# Patient Record
Sex: MALE | Race: AMERICAN INDIAN OR ALASKA NATIVE
[De-identification: names, ages, dates, MRNs, and addresses within clinical notes are randomized per-mention and may not be internally consistent; named-entity substitution may affect disease eponyms.]

---

## 2018-12-19 ENCOUNTER — HOSPITAL ENCOUNTER (EMERGENCY)
Dept: HOSPITAL 5 - ED | Age: 43
Discharge: HOME | End: 2018-12-19
Payer: SELF-PAY

## 2018-12-19 VITALS — SYSTOLIC BLOOD PRESSURE: 190 MMHG | DIASTOLIC BLOOD PRESSURE: 130 MMHG

## 2018-12-19 DIAGNOSIS — N48.30: Primary | ICD-10-CM

## 2018-12-19 DIAGNOSIS — D57.3: ICD-10-CM

## 2018-12-19 DIAGNOSIS — Z88.2: ICD-10-CM

## 2018-12-19 DIAGNOSIS — Z88.0: ICD-10-CM

## 2018-12-19 PROCEDURE — 96374 THER/PROPH/DIAG INJ IV PUSH: CPT

## 2018-12-19 PROCEDURE — 96372 THER/PROPH/DIAG INJ SC/IM: CPT

## 2018-12-19 PROCEDURE — 54220 IRRG CRPRA CAVRNOSA PRIAPISM: CPT

## 2018-12-19 PROCEDURE — 99283 EMERGENCY DEPT VISIT LOW MDM: CPT

## 2018-12-19 NOTE — EMERGENCY DEPARTMENT REPORT
ED General Adult HPI





- General


Chief complaint: Urogenital-Male


Stated complaint: ERECTION 4 HOURS


Time Seen by Provider: 12/19/18 12:31


Source: patient, EMS


Mode of arrival: Stretcher


Limitations: No Limitations





- History of Present Illness


Initial comments: 


43-year-old male with a history of sickle cell trait and recurrent priapism 

presents with a prolonged erection.  Patient states that after presenting to the

emergency department yesterday and having the procedure to achieve detumscence 

from his priapism patient left from the emergency department.  Patient states 

that he doesn't believe his priapism ever truly resolved and thus presented 

right back to emergency department.  Patient denies any hematuria.  Patient is 

uncomfortable and states the pain is 10 out of 10.





- Related Data


                                Home Medications











 Medication  Instructions  Recorded  Confirmed  Last Taken


 


No Known Home Medications [No  12/17/18 12/17/18 Unknown





Reported Home Medications]    











                                    Allergies











Allergy/AdvReac Type Severity Reaction Status Date / Time


 


Penicillins Allergy  Hives Verified 12/10/18 12:31


 


Sulfa (Sulfonamide Allergy  Hives Verified 12/10/18 12:31





Antibiotics)     














ED Review of Systems


ROS: 


Stated complaint: ERECTION 4 HOURS


Other details as noted in HPI





Constitutional: denies: chills, fever


Eyes: denies: eye pain, eye discharge, vision change


ENT: denies: ear pain, throat pain


Respiratory: denies: cough, shortness of breath, wheezing


Cardiovascular: denies: chest pain, palpitations


Endocrine: no symptoms reported


Gastrointestinal: denies: abdominal pain, nausea, diarrhea


Genitourinary: other (priapism).  denies: urgency, dysuria


Musculoskeletal: denies: back pain, joint swelling, arthralgia


Skin: denies: rash, lesions


Neurological: denies: headache, weakness, paresthesias


Psychiatric: denies: anxiety, depression


Hematological/Lymphatic: denies: easy bleeding, easy bruising





ED Past Medical Hx





- Past Medical History


Previous Medical History?: Yes


Hx Sickle Cell Disease: Yes (sickle cell trait)


Additional medical history: priaprism.  head injury





- Surgical History


Past Surgical History?: No





- Social History


Smoking Status: Never Smoker


Substance Use Type: None





- Medications


Home Medications: 


                                Home Medications











 Medication  Instructions  Recorded  Confirmed  Last Taken  Type


 


No Known Home Medications [No  12/17/18 12/17/18 Unknown History





Reported Home Medications]     














ED Physical Exam





- General


Limitations: No Limitations


General appearance: alert, other (uncomfortable; obvious pain)





- Head


Head exam: Present: atraumatic, normocephalic





- Eye


Eye exam: Present: normal appearance





- ENT


ENT exam: Present: mucous membranes moist





- Neck


Neck exam: Present: normal inspection





- Respiratory


Respiratory exam: Present: normal lung sounds bilaterally.  Absent: respiratory 

distress





- Cardiovascular


Cardiovascular Exam: Present: regular rate, normal rhythm.  Absent: systolic 

murmur, diastolic murmur, rubs, gallop





- GI/Abdominal


GI/Abdominal exam: Present: soft, normal bowel sounds





- Rectal


Rectal exam: Present: deferred





- 


 exam: Present: other (priapism present)





- Extremities Exam


Extremities exam: Present: normal inspection





- Back Exam


Back exam: Present: normal inspection





- Neurological Exam


Neurological exam: Present: alert, oriented X3





- Psychiatric


Psychiatric exam: Present: normal affect, normal mood





- Skin


Skin exam: Present: warm, dry, intact, normal color.  Absent: rash





ED Course


                                   Vital Signs











  12/19/18 12/19/18





  12:14 12:48


 


Pulse Rate 68 


 


Respiratory 18 20





Rate  


 


Blood Pressure 190/130 


 


O2 Sat by Pulse 97 





Oximetry  














- Procedure Description


Procedures done: Aspiration of corpus cavernosum.  Patient initially was given 

terbutaline 0.25 mg subcutaneous and then received local anesthesia on the left 

corpora cavernosum.  Patient then received a total of 200 mcg of phenylephrine. 

Patient achieved detumscence with this procedure.





ED Medical Decision Making





- Medical Decision Making


Patient achieved demtuscence with Phenylephrine therapy and is in no acute 

distress. Patient monitored for one hour and is comfortable and texting on cell 

phone and will be discharged to follow up with Urology. 





- Differential Diagnosis


Priapism; 


Critical care attestation.: 


If time is entered above; I have spent that time in minutes in the direct care 

of this critically ill patient, excluding procedure time.








ED Disposition


Clinical Impression: 


 Priapism





Disposition: DC-01 TO HOME OR SELFCARE


Is pt being admited?: No


Condition: Fair


Referrals: 


PRIMARY CARE,MD [Primary Care Provider] - 3-5 Days


CRISTOPHER PATEL MD [Staff Physician] - 3-5 Days


Time of Disposition: 14:00


Print Language: ENGLISH

## 2022-07-15 ENCOUNTER — HOSPITAL ENCOUNTER (EMERGENCY)
Dept: HOSPITAL 5 - ED | Age: 47
Discharge: PSYCHIATRIC OR SUBSTANCE USE FACILITY | End: 2022-07-15
Payer: SELF-PAY

## 2022-07-15 VITALS — DIASTOLIC BLOOD PRESSURE: 78 MMHG | SYSTOLIC BLOOD PRESSURE: 147 MMHG

## 2022-07-15 DIAGNOSIS — Z88.2: ICD-10-CM

## 2022-07-15 DIAGNOSIS — Z88.0: ICD-10-CM

## 2022-07-15 DIAGNOSIS — N48.30: Primary | ICD-10-CM

## 2022-07-15 PROCEDURE — 54220 IRRG CRPRA CAVRNOSA PRIAPISM: CPT

## 2022-07-15 PROCEDURE — 99283 EMERGENCY DEPT VISIT LOW MDM: CPT

## 2022-07-15 PROCEDURE — 96374 THER/PROPH/DIAG INJ IV PUSH: CPT

## 2022-07-15 PROCEDURE — 96376 TX/PRO/DX INJ SAME DRUG ADON: CPT

## 2022-07-15 PROCEDURE — 96375 TX/PRO/DX INJ NEW DRUG ADDON: CPT

## 2022-07-15 NOTE — EMERGENCY DEPARTMENT REPORT
ED Male  HPI





- General


Chief complaint: Urogenital-Male


Stated complaint: PRIA PRISM


Time Seen by Provider: 07/15/22 08:11


Source: patient, EMS


Mode of arrival: Stretcher


Limitations: No Limitations





- History of Present Illness


Initial comments: 





47-year-old male with a past medical history of sickle cell disease and 

recurrent) presents from [facility] with priapism for 

greater than 12 hours.  Patient now complains of 10/10 pain and is restless.  

Pain is worse with palpation.  No alleviating factors reported.  No history of 

trauma.  Patient is currently voluntary [facility] for psychosis





- Related Data


                                  Previous Rx's











 Medication  Instructions  Recorded  Last Taken  Type


 


HYDROcodone/APAP 5-325 [[place] 1 - 2 each PO Q6HR PRN #14 tablet 12/24/18 Unknown

 Rx





5/325]    











                                    Allergies











Allergy/AdvReac Type Severity Reaction Status Date / Time


 


Penicillins Allergy  Hives Verified 12/10/18 12:31


 


Sulfa (Sulfonamide Allergy  Hives Verified 12/10/18 12:31





Antibiotics)     














ED Review of Systems


ROS: 


Stated complaint: PRIA PRISM


Other details as noted in HPI





Comment: All other systems reviewed and negative





ED Past Medical Hx





- Past Medical History


Previous Medical History?: Yes


Hx Hypertension: No


Hx CVA: No


Hx Heart Attack/AMI: No


Hx Congestive Heart Failure: No


Hx Diabetes: No


Hx Deep Vein Thrombosis: No


Hx Pulmonary Embolism: No


Hx GERD: No


Hx Liver Disease: No


Hx Renal Disease: No


Hx of Cancer: No


Hx Sickle Cell Disease: Yes (sickle cell trait)


Hx Arthritis: No


Hx Headaches / Migraines: No


Hx Seizures: No


Hx Kidney Stones: No


Hx Psychiatric Treatment: No


Hx Asthma: No


Hx COPD: No


Hx Tuberculosis: No


Hx Dementia: No


Hx HIV: No


Additional medical history: priaprism.  TBI





- Surgical History


Past Surgical History?: No


Hx Coronary Stent: No


Hx Open Heart Surgery: No


Hx Pacemaker: No


Hx Internal Defibrillator: No


Hx Cholecystectomy: No


Hx Appendectomy: No


Hx Breast Surgery: No





- Social History


Smoking Status: Never Smoker





- Medications


Home Medications: 


                                Home Medications











 Medication  Instructions  Recorded  Confirmed  Last Taken  Type


 


HYDROcodone/APAP 5-325 [[place] 1 - 2 each PO Q6HR PRN #14 tablet 12/24/18  

Unknown Rx





5/325]     














ED Physical Exam





- General


Limitations: No Limitations





- Other


Other exam information: 





General: Acute distress secondary to pain


Head: Atraumatic


Eyes: normal appearance


ENT: Moist mucous membranes


Neck: Normal appearance, no midline tenderness


Chest: Clear to auscultation bilaterally


CV: Regular rate and rhythm


Abdomen: Soft, normal bowel sounds, nontender, nondistended, no rebound or 

guarding


: Circumcised male with priapsm with tenderness to light palpation


Back: Normal inspection


Extremity: Normal inspection, full range of motion


Neuro: Alert O x 3, no facial asymmetry, speech clear, no gross motor sensory 

deficit


Psych: Appropriate behavior


Skin: No rash





ED Course


                                   Vital Signs











  07/15/22 07/15/22 07/15/22





  08:05 08:27 08:43


 


Temperature   


 


Pulse Rate 92 H 74 84


 


Respiratory 18 22 22





Rate   


 


Blood Pressure 151/115  146/103





[Left]   


 


O2 Sat by Pulse 99 100 100





Oximetry   














  07/15/22 07/15/22





  09:30 11:54


 


Temperature  97.8 F


 


Pulse Rate 61 60


 


Respiratory 20 22





Rate  


 


Blood Pressure 144/87 147/78





[Left]  


 


O2 Sat by Pulse 100 100





Oximetry  














- Reevaluation(s)


Reevaluation #1: 





07/15/22 11:33


Full detumescence at this time patient in no acute distress





- Penile Procedure


Consent Obtained: verbal consent


Time Out Performed: Yes


Indication: priapism management


Local Anesthesia Used: Lidocaine 1% without EPI


Amount of Anesthesia Used (mls): 4


Priapism Management: aspiration


Complications: none


Patient Tolerated Procedure: well


Additional Comments: 


100 mL of dark blood aspirated from the penis with improvement in pain and 

partial detumescence 


Injected with phenylephrine 500 mcg


[place] pressure dressing applied to prevent postinjection hematoma





ED Medical Decision Making





- Medical Decision Making





47-year-old male with sickle cell disease presents to the hospital with 

recurrent priapism.  Successful detumescence after aspiration of blood and 

injection of phenylephrine.  Patient will be discharged back to [facility]


Critical Care Time: No


Critical care attestation.: 


If time is entered above; I have spent that time in minutes in the direct care 

of this critically ill patient, excluding procedure time.








ED Disposition


Clinical Impression: 


 Priapism due to sickle cell disease





Disposition: [address] [place] [address]


Is pt being admited?: No


Does the pt Need Aspirin: No


Condition: Stable


Instructions:  Priapism


Additional Instructions: 


 Follow-up with your doctor or doctor/clinic provided.  Return if symptoms 

worsen as indicated by your discharge instructions.


Referrals: 


PRIMARY CARE,MD [Primary Care Provider] - 3-5 Days


ANGELINE GAMING MD [Staff Physician] - 3-5 Days (Urologist)


STEPHANY ROJAS MD [Referring] - 3-5 Days (Hematology)


Time of Disposition: 11:36

## 2022-07-18 ENCOUNTER — HOSPITAL ENCOUNTER (EMERGENCY)
Dept: HOSPITAL 5 - ED | Age: 47
Discharge: HOME | End: 2022-07-18
Payer: SELF-PAY

## 2022-07-18 VITALS — DIASTOLIC BLOOD PRESSURE: 106 MMHG | SYSTOLIC BLOOD PRESSURE: 155 MMHG

## 2022-07-18 DIAGNOSIS — N48.39: Primary | ICD-10-CM

## 2022-07-18 DIAGNOSIS — Z88.0: ICD-10-CM

## 2022-07-18 DIAGNOSIS — Z88.1: ICD-10-CM

## 2022-07-18 PROCEDURE — 96374 THER/PROPH/DIAG INJ IV PUSH: CPT

## 2022-07-18 PROCEDURE — 96375 TX/PRO/DX INJ NEW DRUG ADDON: CPT

## 2022-07-18 PROCEDURE — 96376 TX/PRO/DX INJ SAME DRUG ADON: CPT

## 2022-07-18 PROCEDURE — 54220 IRRG CRPRA CAVRNOSA PRIAPISM: CPT

## 2022-07-18 PROCEDURE — 99283 EMERGENCY DEPT VISIT LOW MDM: CPT

## 2022-07-18 NOTE — EMERGENCY DEPARTMENT REPORT
<JUSTINA QUARLES - Last Filed: 07/18/22 16:36>





ED Male  HPI





- General


Chief complaint: Urogenital-Male


Stated complaint: ERECTION SINCE 07/17/22 @ 2200


Time Seen by Provider: 07/18/22 07:58





- Related Data


                                  Previous Rx's











 Medication  Instructions  Recorded  Last Taken  Type


 


HYDROcodone/APAP 5-325 [[place] 1 - 2 each PO Q6HR PRN #14 tablet 12/24/18 Unknown

 Rx





5/325]    











                                    Allergies











Allergy/AdvReac Type Severity Reaction Status Date / Time


 


Penicillins Allergy  Hives Verified 07/18/22 07:59


 


Sulfa (Sulfonamide Allergy  Hives Verified 07/18/22 07:59





Antibiotics)     














ED Past Medical Hx





- Medications


Home Medications: 


                                Home Medications











 Medication  Instructions  Recorded  Confirmed  Last Taken  Type


 


HYDROcodone/APAP 5-325 [[place] 1 - 2 each PO Q6HR PRN #14 tablet 12/24/18  

Unknown Rx





5/325]     














ED Course





- Reevaluation(s)


Reevaluation #2: 





07/18/22 16:38


I have seen the patient after the procedure and according to the patient the 

penis has become flaccid which is consistent with my physical examination.  

Patient was able to make follow-up appoint with medical provider to be seen 

within 3 to 5 days.





ED Disposition


Clinical Impression: 


 Priapism due to disease classified elsewhere, Priapism due to sickle cell 

disease, Priapism, unspecified





Disposition: 01 HOME / SELF CARE / HOMELESS


Is pt being admited?: No


Does the pt Need Aspirin: No


Condition: Stable


Referrals: 


DAVID ANGELES MD [Primary Care Provider] - 3-5 Days


Time of Disposition: 16:37





<LESVIA QUEZADA - Last Filed: 07/19/22 06:47>





ED Male  HPI





- General


Source: patient, EMS


Mode of arrival: Ambulatory


Limitations: No Limitations





- History of Present Illness


Initial comments: 





47-year-old Afro-American male with a history of sickle cell recurrent priapism 

who presents with penile pain and erection that started last night all resolved.

 No fever or chills reported.  Patient also denied any use of erectile 

dysfunction medication.  Patient rated his pain as 10/10 in severity.  No other 

modifying or associated factors reported.





ED Review of Systems


ROS: 


Stated complaint: ERECTION SINCE 07/17/22 @ 2200


Other details as noted in HPI





Comment: All other systems reviewed and negative


Genitourinary: other ([place] pain and swelling)





ED Past Medical Hx





- Past Medical History


Hx Hypertension: No


Hx CVA: No


Hx Heart Attack/AMI: No


Hx Congestive Heart Failure: No


Hx Diabetes: No


Hx Deep Vein Thrombosis: No


Hx Pulmonary Embolism: No


Hx GERD: No


Hx Liver Disease: No


Hx Renal Disease: No


Hx Sickle Cell Disease: Yes (sickle cell trait)


Hx Arthritis: No


Hx Headaches / Migraines: No


Hx Seizures: No


Hx Kidney Stones: No


Hx Psychiatric Treatment: No


Hx Asthma: No


Hx COPD: No


Hx Tuberculosis: No


Hx Dementia: No


Hx HIV: No


Additional medical history: priaprism.  TBI





- Surgical History


Hx Coronary Stent: No


Hx Open Heart Surgery: No


Hx Pacemaker: No


Hx Internal Defibrillator: No


Hx Cholecystectomy: No


Hx Appendectomy: No


Hx Breast Surgery: No





- Social History


Smoking Status: Never Smoker





ED Physical Exam





- General


Limitations: No Limitations


General appearance: alert, anxious, in distress (Due to.  Patient)





- ENT


ENT exam: Present: mucous membranes dry





- Neck


Neck exam: Present: full ROM





- Respiratory


Respiratory exam: Present: normal lung sounds bilaterally.  Absent: respiratory 

distress, accessory muscle use





- Cardiovascular


Cardiovascular Exam: Present: regular rate, normal rhythm, normal heart sounds





- GI/Abdominal


GI/Abdominal exam: Present: soft, normal bowel sounds.  Absent: distended, 

tenderness





- 


 exam: Present: other (Priapism and penile tenderness)





- Back Exam


Back exam: Absent: tenderness





- Neurological Exam


Neurological exam: Present: alert, oriented X3





- Psychiatric


Psychiatric exam: Present: agitated, anxious





- Skin


Skin exam: Present: warm, intact





ED Course


                                   Vital Signs











  07/18/22 07/18/22 07/18/22





  07:43 08:15 08:36


 


Temperature 97.6 F  


 


Pulse Rate 84  


 


Respiratory 18  20





Rate   


 


Blood Pressure   


 


Blood Pressure 149/94  186/116





[Right]   


 


O2 Sat by Pulse 98 98 100





Oximetry   














  07/18/22 07/18/22 07/18/22





  08:54 08:55 09:01


 


Temperature   


 


Pulse Rate   


 


Respiratory   





Rate   


 


Blood Pressure  216/128 216/128


 


Blood Pressure   





[Right]   


 


O2 Sat by Pulse 98 100 99





Oximetry   














  07/18/22 07/18/22 07/18/22





  09:05 09:11 09:15


 


Temperature   


 


Pulse Rate   


 


Respiratory   





Rate   


 


Blood Pressure 216/128 216/128 216/128


 


Blood Pressure   





[Right]   


 


O2 Sat by Pulse 97 97 97





Oximetry   














  07/18/22 07/18/22 07/18/22





  09:21 09:25 09:31


 


Temperature   


 


Pulse Rate   


 


Respiratory   





Rate   


 


Blood Pressure 216/128 216/128 216/128


 


Blood Pressure   





[Right]   


 


O2 Sat by Pulse 97 97 97





Oximetry   














  07/18/22 07/18/22 07/18/22





  09:55 12:15 12:21


 


Temperature   


 


Pulse Rate   


 


Respiratory   





Rate   


 


Blood Pressure 153/95 156/93 156/93


 


Blood Pressure   





[Right]   


 


O2 Sat by Pulse 97 98 98





Oximetry   














  07/18/22 07/18/22 07/18/22





  12:25 12:31 12:35


 


Temperature   


 


Pulse Rate   


 


Respiratory   





Rate   


 


Blood Pressure 156/93 156/93 156/93


 


Blood Pressure   





[Right]   


 


O2 Sat by Pulse 98 97 99





Oximetry   














  07/18/22 07/18/22 07/18/22





  12:41 12:45 12:51


 


Temperature   


 


Pulse Rate   


 


Respiratory   





Rate   


 


Blood Pressure 156/93 156/93 161/92


 


Blood Pressure   





[Right]   


 


O2 Sat by Pulse 97 98 96





Oximetry   














  07/18/22 07/18/22 07/18/22





  12:55 13:01 13:05


 


Temperature   


 


Pulse Rate   


 


Respiratory   





Rate   


 


Blood Pressure 161/92 161/92 161/92


 


Blood Pressure   





[Right]   


 


O2 Sat by Pulse 98 98 98





Oximetry   














  07/18/22 07/18/22 07/18/22





  13:11 13:15 13:21


 


Temperature   


 


Pulse Rate   


 


Respiratory   





Rate   


 


Blood Pressure 161/92 161/92 161/92


 


Blood Pressure   





[Right]   


 


O2 Sat by Pulse 98 98 98





Oximetry   














  07/18/22 07/18/22 07/18/22





  13:25 13:31 13:35


 


Temperature   


 


Pulse Rate   


 


Respiratory   





Rate   


 


Blood Pressure 161/92 161/92 161/92


 


Blood Pressure   





[Right]   


 


O2 Sat by Pulse 98 98 98





Oximetry   














  07/18/22 07/18/22 07/18/22





  13:41 13:45 13:51


 


Temperature   


 


Pulse Rate   


 


Respiratory   





Rate   


 


Blood Pressure 161/92 161/92 149/91


 


Blood Pressure   





[Right]   


 


O2 Sat by Pulse 98 97 98





Oximetry   














  07/18/22 07/18/22 07/18/22





  13:55 14:01 14:05


 


Temperature   


 


Pulse Rate   


 


Respiratory   





Rate   


 


Blood Pressure 149/91 149/91 149/91


 


Blood Pressure   





[Right]   


 


O2 Sat by Pulse 98 99 98





Oximetry   














  07/18/22 07/18/22 07/18/22





  14:11 14:15 14:21


 


Temperature   


 


Pulse Rate   


 


Respiratory   





Rate   


 


Blood Pressure 149/91 149/91 149/91


 


Blood Pressure   





[Right]   


 


O2 Sat by Pulse 98 97 98





Oximetry   














  07/18/22 07/18/22 07/18/22





  14:25 14:31 14:35


 


Temperature   


 


Pulse Rate   


 


Respiratory   





Rate   


 


Blood Pressure 149/91 149/91 149/91


 


Blood Pressure   





[Right]   


 


O2 Sat by Pulse 98 97 99





Oximetry   














  07/18/22 07/18/22 07/18/22





  14:41 14:45 14:51


 


Temperature   


 


Pulse Rate   


 


Respiratory   





Rate   


 


Blood Pressure 161/92 161/92 155/106


 


Blood Pressure   





[Right]   


 


O2 Sat by Pulse 99 97 98





Oximetry   














  07/18/22 07/18/22 07/18/22





  14:55 15:01 15:05


 


Temperature   


 


Pulse Rate   


 


Respiratory   





Rate   


 


Blood Pressure 155/106 155/106 155/106


 


Blood Pressure   





[Right]   


 


O2 Sat by Pulse 98 99 99





Oximetry   














  07/18/22 07/18/22 07/18/22





  15:11 15:15 15:21


 


Temperature   


 


Pulse Rate   


 


Respiratory   





Rate   


 


Blood Pressure 155/106 155/106 155/106


 


Blood Pressure   





[Right]   


 


O2 Sat by Pulse 99 98 98





Oximetry   














  07/18/22 07/18/22 07/18/22





  15:25 15:31 15:35


 


Temperature   


 


Pulse Rate   


 


Respiratory   





Rate   


 


Blood Pressure 155/106 155/106 155/106


 


Blood Pressure   





[Right]   


 


O2 Sat by Pulse 98 98 98





Oximetry   














  07/18/22 07/18/22 07/18/22





  15:41 15:45 15:51


 


Temperature   


 


Pulse Rate   


 


Respiratory   





Rate   


 


Blood Pressure 155/106 155/106 155/106


 


Blood Pressure   





[Right]   


 


O2 Sat by Pulse 97 97 98





Oximetry   














  07/18/22





  15:55


 


Temperature 


 


Pulse Rate 


 


Respiratory 





Rate 


 


Blood Pressure 155/106


 


Blood Pressure 





[Right] 


 


O2 Sat by Pulse 98





Oximetry 














- Reevaluation(s)


Reevaluation #1: 





07/18/22 08:12


here with recurrent priampism and with history of sickle cell disease--pt 

requested for IV medication trial first before aspiration-- so given morphine 2 

mg Iv x 1 and Ativan 2 mg IV x 1 given while waiting for ordered phenylepherin 

for the pharmacy to bring it-- 





- Penile Procedure


Consent Obtained: verbal consent


Time Out Performed: Yes


Indication: priapism management


Procedural Sedation: No (given dilaudid and morphine with ativan )


Sedation/Analgesia: benzodiazepines


Local Anesthesia Used: Local Injection


Amount of Anesthesia Used (mls): 4


Reduction of Paraphimosis: manual pressure


Priapism Management: aspiration, phenylephrine injection


Complications: none


Patient Tolerated Procedure: no complications, other (noted with nursing home 

detumescence --)


Additional Comments: 





about 50 cc blood aspirated with 5 x 10 cc syringe--dark red blood


Critical care attestation.: 


If time is entered above; I have spent that time in minutes in the direct care 

of this critically ill patient, excluding procedure time.








ED Disposition


Does the pt Need Aspirin: No